# Patient Record
Sex: MALE | Race: BLACK OR AFRICAN AMERICAN
[De-identification: names, ages, dates, MRNs, and addresses within clinical notes are randomized per-mention and may not be internally consistent; named-entity substitution may affect disease eponyms.]

---

## 2017-03-03 ENCOUNTER — HOSPITAL ENCOUNTER (EMERGENCY)
Dept: HOSPITAL 72 - EMR | Age: 37
Discharge: HOME | End: 2017-03-03
Payer: MEDICARE

## 2017-03-03 VITALS — HEIGHT: 70 IN | BODY MASS INDEX: 35.07 KG/M2 | WEIGHT: 245 LBS

## 2017-03-03 VITALS — DIASTOLIC BLOOD PRESSURE: 81 MMHG | SYSTOLIC BLOOD PRESSURE: 137 MMHG

## 2017-03-03 VITALS — SYSTOLIC BLOOD PRESSURE: 141 MMHG | DIASTOLIC BLOOD PRESSURE: 93 MMHG

## 2017-03-03 DIAGNOSIS — J45.909: Primary | ICD-10-CM

## 2017-03-03 DIAGNOSIS — Z88.0: ICD-10-CM

## 2017-03-03 PROCEDURE — 94664 DEMO&/EVAL PT USE INHALER: CPT

## 2017-03-03 PROCEDURE — 94640 AIRWAY INHALATION TREATMENT: CPT

## 2017-03-03 PROCEDURE — 99283 EMERGENCY DEPT VISIT LOW MDM: CPT

## 2017-03-03 NOTE — EMERGENCY ROOM REPORT
History of Present Illness


General


Chief Complaint:  Asthma


Source:  Patient, Medical Record





Present Illness


HPI


The patient is a 36-year-old male with a history of asthma presenting with 

chest tightness which began this morning.  The patient states that he has run 

out of his albuterol which usually helps.  The patient denies any other 

symptoms including chest pain, shortness of breath, cough, nausea, vomiting, 

fever, chills, headache, dizziness


Allergies:  


Coded Allergies:  


     PENICILLINS (Unverified  Allergy, Unknown, 12/9/14)





Patient History


Past Medical History:  see triage record, asthma


Pertinent Family History:  none


Reviewed Nursing Documentation:  PMH: Agreed, PSxH: Agreed





Nursing Documentation-PMH


Hx Asthma:  Yes





Review of Systems


All Other Systems:  negative except mentioned in HPI





Physical Exam





Vital Signs








  Date Time  Temp Pulse Resp B/P Pulse Ox O2 Delivery O2 Flow Rate FiO2


 


3/3/17 15:23 99.0 114 16 141/93 99 Room Air  








Sp02 EP Interpretation:  reviewed, normal


General Appearance:  no apparent distress, alert, GCS 15, non-toxic


Head:  normocephalic, atraumatic


Eyes:  bilateral eye PERRL, bilateral eye normal inspection


ENT:  hearing grossly normal, normal pharynx, no angioedema, normal voice


Neck:  full range of motion, supple/symm/no masses


Respiratory:  chest non-tender, no respiratory distress, no retraction, no 

accessory muscle use, no wheezing, decreased breath sounds


Cardiovascular #1:  regular rate, rhythm, no edema


Musculoskeletal:  back normal, gait/station normal, normal range of motion, non-

tender


Neurologic:  alert, oriented x3, responsive, motor strength/tone normal, 

sensory intact, speech normal


Psychiatric:  judgement/insight normal, memory normal, mood/affect normal, no 

suicidal/homicidal ideation


Reflexes:  3+ bicep (R), 3+ bicep (L), 3+ tricep (R), 3+ tricep (L), 3+ knee (R)

, 3+ knee (L)


Skin:  normal color, no rash, warm/dry, well hydrated


Lymphatic:  no adenopathy





Medical Decision Making


PA Attestation


Dr. Ng is my supervising physician. Patient management was discussed with 

my supervising physician


Diagnostic Impression:  


 Primary Impression:  


 Asthma


ER Course


The patient is a 36-year-old male with a history of asthma presenting for 

asthma exacerbation





Differential diagnoses considered but not limited to: Asthma exacerbation, 

bronchitis, pneumonia, anxiety





Physical exam: Vitals within normal limits.  No apparent distress


HEENT exam is unremarkable


Lungs: Decreased breath sounds bilaterally.  Chest is nontender.  No 

respiratory distress.  No accessory muscle use.





The patient was given a breathing treatment and is feeling much better.


Lungs sounds have increased





Patient is discharged home with a prescription for albuterol and will followup 

with PMD.  ER precautions are given





Last Vital Signs








  Date Time  Temp Pulse Resp B/P Pulse Ox O2 Delivery O2 Flow Rate FiO2


 


3/3/17 16:44 99.0 100 18 137/81 100 Room Air  








Status:  improved


Disposition:  HOME, SELF-CARE


Condition:  Improved


Scripts


Albuterol Sulfate* (PROAIR HFA*) 8.5 Gm Hfa.aer.ad


2 PUFFS INH Q6H, #8.5 GM 0 Refills


   Prov: PRIYANKA MONTEMAYOR         3/3/17


Referrals:  


Zarina Villegas MD (PCP)


Patient Instructions:  Asthma, Adult





Additional Instructions:  


I discussed my findings with the patient. All questions and concerns have been 

answered. Treatment and medication compliance have been addressed. I advised 

the patient that they need to follow up with PMD in 3-5 days. Return to ED if 

symptoms worsen, new symptoms arise, or if needed for any reason. Patient 

verbalized understanding of discharge instructions.











PRIYANKA MONTEMAYOR Mar 3, 2017 17:59

## 2017-07-03 ENCOUNTER — HOSPITAL ENCOUNTER (INPATIENT)
Dept: HOSPITAL 72 - EMR | Age: 37
LOS: 2 days | Discharge: LEFT BEFORE DISCHARGE | DRG: 203 | End: 2017-07-05
Payer: MEDICARE

## 2017-07-03 VITALS — SYSTOLIC BLOOD PRESSURE: 125 MMHG | DIASTOLIC BLOOD PRESSURE: 80 MMHG

## 2017-07-03 VITALS — SYSTOLIC BLOOD PRESSURE: 134 MMHG | DIASTOLIC BLOOD PRESSURE: 67 MMHG

## 2017-07-03 VITALS — DIASTOLIC BLOOD PRESSURE: 73 MMHG | SYSTOLIC BLOOD PRESSURE: 116 MMHG

## 2017-07-03 VITALS — SYSTOLIC BLOOD PRESSURE: 104 MMHG | DIASTOLIC BLOOD PRESSURE: 43 MMHG

## 2017-07-03 VITALS — SYSTOLIC BLOOD PRESSURE: 148 MMHG | DIASTOLIC BLOOD PRESSURE: 100 MMHG

## 2017-07-03 VITALS — WEIGHT: 240 LBS | BODY MASS INDEX: 35.55 KG/M2 | HEIGHT: 69 IN

## 2017-07-03 VITALS — DIASTOLIC BLOOD PRESSURE: 88 MMHG | SYSTOLIC BLOOD PRESSURE: 149 MMHG

## 2017-07-03 VITALS — SYSTOLIC BLOOD PRESSURE: 135 MMHG | DIASTOLIC BLOOD PRESSURE: 72 MMHG

## 2017-07-03 VITALS — DIASTOLIC BLOOD PRESSURE: 75 MMHG | SYSTOLIC BLOOD PRESSURE: 128 MMHG

## 2017-07-03 DIAGNOSIS — M54.5: ICD-10-CM

## 2017-07-03 DIAGNOSIS — J45.901: Primary | ICD-10-CM

## 2017-07-03 DIAGNOSIS — F11.90: ICD-10-CM

## 2017-07-03 DIAGNOSIS — F12.90: ICD-10-CM

## 2017-07-03 DIAGNOSIS — E66.9: ICD-10-CM

## 2017-07-03 DIAGNOSIS — F15.90: ICD-10-CM

## 2017-07-03 DIAGNOSIS — D72.829: ICD-10-CM

## 2017-07-03 DIAGNOSIS — G47.30: ICD-10-CM

## 2017-07-03 LAB
ALBUMIN/GLOB SERPL: 1.3 {RATIO} (ref 1–2.7)
ALT SERPL-CCNC: 20 U/L (ref 3–41)
ANION GAP SERPL CALC-SCNC: 15 MMOL/L (ref 5–15)
APPEARANCE UR: CLEAR
APTT BLD: 29 SEC (ref 23–33)
ARTERIAL PATENCY WRIST A: POSITIVE
AST SERPL-CCNC: 21 U/L (ref 5–40)
BACTERIA #/AREA URNS HPF: (no result) /HPF
BASE EXCESS STD BLDA CALC-SCNC: -3.2 MMOL/L
BASOPHILS NFR BLD AUTO: 0.9 % (ref 0–2)
CALCIUM SERPL-MCNC: 9.1 MG/DL (ref 8.6–10.2)
CHLORIDE SERPL-SCNC: 100 MEQ/L (ref 98–107)
CK MB SERPL-MCNC: 5 NG/ML (ref ?–6.7)
CO2 SERPL-SCNC: 25 MEQ/L (ref 20–30)
CREAT SERPL-MCNC: 1.2 MG/DL (ref 0.7–1.2)
EOSINOPHIL NFR BLD AUTO: 4.2 % (ref 0–3)
ERYTHROCYTE [DISTWIDTH] IN BLOOD BY AUTOMATED COUNT: 13.2 % (ref 11.6–14.8)
GFR SERPLBLD BASED ON 1.73 SQ M-ARVRAT: > 60 ML/MIN (ref 60–?)
GLOBULIN SER-MCNC: 3.2 G/DL
HEMOLYSIS: 5
INR PPP: 1 (ref 0.9–1.1)
KETONES UR QL STRIP: NEGATIVE
LEUKOCYTE ESTERASE UR QL STRIP: (no result)
LYMPHOCYTES NFR BLD AUTO: 14.8 % (ref 20–45)
MCH RBC QN AUTO: 27.1 PG (ref 27–31)
MCHC RBC AUTO-ENTMCNC: 31.9 G/DL (ref 32–36)
MCV RBC AUTO: 85 FL (ref 80–99)
MONOCYTES NFR BLD AUTO: 6.6 % (ref 1–10)
MUCOUS THREADS #/AREA URNS LPF: (no result) /LPF
NEUTROPHILS NFR BLD AUTO: 73.5 % (ref 45–75)
NITRITE UR QL STRIP: NEGATIVE
PCO2 BLDA: 38.4 MMHG (ref 35–45)
PH UR STRIP: 5 [PH] (ref 4.5–8)
PLATELET # BLD: 240 K/UL (ref 150–450)
PMV BLD AUTO: 9.7 FL (ref 6.5–10.1)
POTASSIUM SERPL-SCNC: 3.8 MEQ/L (ref 3.4–4.9)
PROT SERPL-MCNC: 7.6 G/DL (ref 6.6–8.7)
PROT UR QL STRIP: (no result)
PROTHROMBIN TIME: 10.5 SEC (ref 9.3–11.5)
RBC # BLD AUTO: 5.48 M/UL (ref 4.7–6.1)
RBC #/AREA URNS HPF: (no result) /HPF (ref 0–0)
SODIUM SERPL-SCNC: 140 MEQ/L (ref 135–145)
SP GR UR STRIP: 1.02 (ref 1–1.03)
SQUAMOUS #/AREA URNS LPF: (no result) /LPF
TROPONIN I SERPL-MCNC: < 0.3 NG/ML (ref ?–0.3)
UROBILINOGEN UR-MCNC: NORMAL MG/DL (ref 0–1)
WBC # BLD AUTO: 13.4 K/UL (ref 4.8–10.8)
WBC #/AREA URNS HPF: (no result) /HPF (ref 0–0)

## 2017-07-03 PROCEDURE — 82553 CREATINE MB FRACTION: CPT

## 2017-07-03 PROCEDURE — 81003 URINALYSIS AUTO W/O SCOPE: CPT

## 2017-07-03 PROCEDURE — 36415 COLL VENOUS BLD VENIPUNCTURE: CPT

## 2017-07-03 PROCEDURE — 85025 COMPLETE CBC W/AUTO DIFF WBC: CPT

## 2017-07-03 PROCEDURE — 85379 FIBRIN DEGRADATION QUANT: CPT

## 2017-07-03 PROCEDURE — 94660 CPAP INITIATION&MGMT: CPT

## 2017-07-03 PROCEDURE — 80300: CPT

## 2017-07-03 PROCEDURE — 87040 BLOOD CULTURE FOR BACTERIA: CPT

## 2017-07-03 PROCEDURE — 85730 THROMBOPLASTIN TIME PARTIAL: CPT

## 2017-07-03 PROCEDURE — 84484 ASSAY OF TROPONIN QUANT: CPT

## 2017-07-03 PROCEDURE — 71010: CPT

## 2017-07-03 PROCEDURE — 83880 ASSAY OF NATRIURETIC PEPTIDE: CPT

## 2017-07-03 PROCEDURE — 93005 ELECTROCARDIOGRAM TRACING: CPT

## 2017-07-03 PROCEDURE — 82550 ASSAY OF CK (CPK): CPT

## 2017-07-03 PROCEDURE — 94640 AIRWAY INHALATION TREATMENT: CPT

## 2017-07-03 PROCEDURE — 80053 COMPREHEN METABOLIC PANEL: CPT

## 2017-07-03 PROCEDURE — 85610 PROTHROMBIN TIME: CPT

## 2017-07-03 PROCEDURE — 87081 CULTURE SCREEN ONLY: CPT

## 2017-07-03 PROCEDURE — 94664 DEMO&/EVAL PT USE INHALER: CPT

## 2017-07-03 PROCEDURE — 36600 WITHDRAWAL OF ARTERIAL BLOOD: CPT

## 2017-07-03 PROCEDURE — 82803 BLOOD GASES ANY COMBINATION: CPT

## 2017-07-03 RX ADMIN — IPRATROPIUM BROMIDE AND ALBUTEROL SULFATE SCH ML: .5; 3 SOLUTION RESPIRATORY (INHALATION) at 11:00

## 2017-07-03 RX ADMIN — IPRATROPIUM BROMIDE AND ALBUTEROL SULFATE SCH ML: .5; 3 SOLUTION RESPIRATORY (INHALATION) at 15:28

## 2017-07-03 RX ADMIN — IPRATROPIUM BROMIDE AND ALBUTEROL SULFATE SCH ML: .5; 3 SOLUTION RESPIRATORY (INHALATION) at 23:00

## 2017-07-03 RX ADMIN — ALBUTEROL SULFATE SCH MG: 2.5 SOLUTION RESPIRATORY (INHALATION) at 03:01

## 2017-07-03 RX ADMIN — ALBUTEROL SULFATE SCH MG: 2.5 SOLUTION RESPIRATORY (INHALATION) at 02:50

## 2017-07-03 RX ADMIN — IPRATROPIUM BROMIDE AND ALBUTEROL SULFATE SCH ML: .5; 3 SOLUTION RESPIRATORY (INHALATION) at 20:43

## 2017-07-03 RX ADMIN — OXCARBAZEPINE SCH MG: 150 TABLET, FILM COATED ORAL at 18:28

## 2017-07-03 RX ADMIN — ALBUTEROL SULFATE SCH MG: 2.5 SOLUTION RESPIRATORY (INHALATION) at 02:35

## 2017-07-03 RX ADMIN — SODIUM CHLORIDE SCH MLS/HR: 900 INJECTION, SOLUTION INTRAVENOUS at 12:06

## 2017-07-03 RX ADMIN — HEPARIN SODIUM SCH UNITS: 5000 INJECTION INTRAVENOUS; SUBCUTANEOUS at 22:10

## 2017-07-03 RX ADMIN — HEPARIN SODIUM SCH UNITS: 5000 INJECTION INTRAVENOUS; SUBCUTANEOUS at 14:45

## 2017-07-03 NOTE — HISTORY AND PHYSICAL REPORT
DATE OF ADMISSION:  07/03/2017



CHIEF COMPLAINT:  Shortness of breath.



HISTORY OF PRESENT ILLNESS:  This is a 37-year-old 

male with history of bronchial asthma.  The patient is admitted with acute

exacerbation of his asthma.



PAST MEDICAL HISTORY:

1. Bronchial asthma.

2. Chronic low back pain.

3. Obesity.

4. History of proteinuria.

5. History of methamphetamine use.

6. History of suicidal ideations in the past.



MEDICATIONS:  Home medications _____ including Norco, albuterol

inhalation, Zithromax, cephalexin, ciprofloxacin, clindamycin, Flexeril,

famotidine with codeine syrup, fluticasone propionate nasal spray,

gentamicin eye drops, ibuprofen, Latuda, pyridoxine, Trileptal,

risperidone, Zoloft, and tramadol.



ALLERGIES:  Penicillins.



SOCIAL HISTORY:  He lives at home.



HABITS:  Significant for methamphetamine use.  He denies cigarette

smoking.



FAMILY HISTORY:  Unremarkable.



REVIEW OF SYSTEMS:  HEENT:  Ears, Nose, and Throat, normal.

Endocrine:  No history of diabetes, thyroid, or adrenal problems.

Respiratory:  _____ history of present illness.  Cardiovascular:  Denies

chest pain or palpitations.  Gastrointestinal:  No hematochezia, melena,

hematemesis, diarrhea, or constipation.  Genitourinary:  He denies

dysuria, frequency, urgency, or hematuria.  Neurological:  No history of

stroke, syncope, or Parkinson disease.  Psychiatric:  Significant for

history of depression.



PHYSICAL EXAMINATION:

GENERAL:  This is a middle-aged  male, who is obese.

The patient is in moderate respiratory distress.

VITAL SIGNS:  Blood pressure 135/72, pulse 106 apical, respirations

20, temperature is 98.2 degrees, O2 saturation currently is 99% on 2

liters per minute nasal cannula.

HEENT:  The head is normocephalic and atraumatic.  Pupils are equal,

round, and reactive to light and accommodation consensually.

NECK:  Supple.  Trachea midline.  There was no lymphadenopathy or

thyromegaly.

LUNGS:  Bilateral wheezes and extreme with prolongation of the

expiratory phase.

HEART:  Regular rate and rhythm with tachycardia.

ABDOMEN:  Soft and nontender.  Bowel sounds were active.

EXTREMITIES:  No clubbing, cyanosis, or edema.

NEUROLOGICAL:  He is alert and oriented x4.  Cranial nerves II

through XII are intact.



LABORATORY AND ANCILLARY DATA:  CBC shows a white count 13,400.  The

rest is normal.  Serum chemistry, , otherwise within normal limits.

Urinalysis, 1+ leukocyte esterase, sediment is within normal limits, and

1+ protein.  Chest x-ray, no infiltrates.



ASSESSMENT:

1. Chronic obstructive pulmonary disease exacerbation.

2. _____ problems in past medical history.



PLAN:

1. Intensive respiratory treatments.

2. IV steroids.

3. IV antibiotics.

4. Hold narcotics.

5. Respiratory treatment.

6. Pulmonary consult.

7. ABG.









  ______________________________________________

  Ching Laureano M.D.





DR:  FLORES

D:  07/03/2017 08:46

T:  07/03/2017 10:02

JOB#:  [id number]

CC:

## 2017-07-03 NOTE — EMERGENCY ROOM REPORT
History of Present Illness


General


Chief Complaint:  Chest Pain


Source:  Patient





Present Illness


HPI


Patient presents with dyspnea.  This been on for several days.  He was given a 

breathing treatment several days ago emergency room but has not gotten better.  

Denies any fevers.  He is unable to produce any phlegm.  He has crackles and 

wheezes in his chest.  He denies any chest pain per se but has dyspnea on 

exertion.  Himself here.





In the past he's had  an admission for rule out which is negative.





Patient denies calf pain/swelling, hemoptysis.  Denies fever, chills, NVD, 

dysuria.





Denies smoking but admits to some drug use.  Denies SI or HI.





Never been this dyspneic.


Allergies:  


Coded Allergies:  


     PENICILLINS (Unverified  Allergy, Unknown, 12/9/14)





Patient History


Past Medical History:  see triage record


Social History:  Reports: drug use, Denies: smoking


Social History Narrative


drove himself here


Reviewed Nursing Documentation:  PMH: Agreed, PSxH: Agreed





Nursing Documentation-PMH


Past Medical History:  No History, Except For


Hx Asthma:  Yes





Review of Systems


All Other Systems:  negative except mentioned in HPI





Physical Exam





Vital Signs








  Date Time  Temp Pulse Resp B/P Pulse Ox O2 Delivery O2 Flow Rate FiO2


 


7/3/17 02:10 100.0 112 16 129/83 89 Room Air  








Sp02 EP Interpretation:  reviewed, abnormal - hypoxemic on room air


General Appearance:  well appearing, GCS 15, mild distress


Head:  normocephalic


Eyes:  bilateral eye PERRL, bilateral eye Scleral Injection


ENT:  moist mucus membranes


Neck:  supple


Respiratory:  crackles, rales, wheezing, expiration


Cardiovascular #1:  regular rate, rhythm, no edema, no JVD


Cardiovascular #2:  2+ radial (R)


Gastrointestinal:  normal inspection, normal bowel sounds, non tender, no mass, 

non-distended


Musculoskeletal:  back normal, gait/station normal, normal range of motion


Neurologic:  alert, oriented x3, grossly normal


Psychiatric:  mood/affect normal


Skin:  normal inspection, warm/dry





Medical Decision Making


Diagnostic Impression:  


 Primary Impression:  


 Hypoxia


 Additional Impressions:  


 Amphetamine abuse


 Leukocytosis


 Qualified Codes:  D72.829 - Elevated white blood cell count, unspecified


ER Course


Patient presents with dyspnea with rales.  Differential includes pneumonia, 

congestive heart failure, bronchospasm, PE amongst others.  Emergent evaluation 

with EKG, chest x-ray and labs undertaken.  The patient be treated with aspirin 

and nitroglycerin paste morphine and also breathing treatments.  Patient is 

quite hypoxemic on oxygen.





D dimer is negative.





BNP is low.





CXR not significant infiltrates, though with elevated WBC, need to cover for 

pneumonia.





+ tox screen.





Slight improvement, but still with hypoxia.





Discussed with Dr. Miller - admit telemetry.





Laboratory Tests








Test


  7/3/17


02:35 7/3/17


03:55


 


White Blood Count


  13.4 K/UL


(4.8-10.8)  H 


 


 


Red Blood Count


  5.48 M/UL


(4.70-6.10) 


 


 


Hemoglobin


  14.8 G/DL


(14.2-18.0) 


 


 


Hematocrit


  46.5 %


(42.0-52.0) 


 


 


Mean Corpuscular Volume 85 FL (80-99)   


 


Mean Corpuscular Hemoglobin


  27.1 PG


(27.0-31.0) 


 


 


Mean Corpuscular Hemoglobin


Concent 31.9 G/DL


(32.0-36.0)  L 


 


 


Red Cell Distribution Width


  13.2 %


(11.6-14.8) 


 


 


Platelet Count


  240 K/UL


(150-450) 


 


 


Mean Platelet Volume


  9.7 FL


(6.5-10.1) 


 


 


Neutrophils (%) (Auto)


  73.5 %


(45.0-75.0) 


 


 


Lymphocytes (%) (Auto)


  14.8 %


(20.0-45.0)  L 


 


 


Monocytes (%) (Auto)


  6.6 %


(1.0-10.0) 


 


 


Eosinophils (%) (Auto)


  4.2 %


(0.0-3.0)  H 


 


 


Basophils (%) (Auto)


  0.9 %


(0.0-2.0) 


 


 


Prothrombin Time


  10.5 SEC


(9.30-11.50) 


 


 


Prothrombin Time INR 1.0 (0.9-1.1)   


 


PTT


  29 SEC (23-33)


  


 


 


D-Dimer


  < 100 ng/mL


(<500) 


 


 


Sodium Level


  140 mEQ/L


(135-145) 


 


 


Potassium Level


  3.8 mEQ/L


(3.4-4.9) 


 


 


Chloride Level


  100 mEQ/L


() 


 


 


Carbon Dioxide Level


  25 mEQ/L


(20-30) 


 


 


Anion Gap 15 (5-15)   


 


Blood Urea Nitrogen


  13 mg/dL


(7-23) 


 


 


Creatinine


  1.2 mg/dL


(0.7-1.2) 


 


 


Estimate Glomerular


Filtration Rate > 60 mL/min


(>60) 


 


 


Glucose Level


  101 mg/dL


() 


 


 


Calcium Level


  9.1 mg/dL


(8.6-10.2) 


 


 


Total Bilirubin


  0.2 mg/dL


(0.0-1.2) 


 


 


Aspartate Amino Transferase


(AST) 21 U/L (5-40)  


  


 


 


Alanine Aminotransferase (ALT) 20 U/L (3-41)   


 


Alkaline Phosphatase


  73 U/L


() 


 


 


Total Creatine Kinase


  515 U/L


()  H 


 


 


Creatine Kinase MB


  5.0 ng/mL (<


6.7) 


 


 


Creatine Kinase MB Relative


Index 0.9  


  


 


 


Troponin I


  < 0.30 ng/mL


(<=0.30) 


 


 


Pro-B-Type Natriuretic Peptide


  9 pg/mL


(0-125) 


 


 


Total Protein


  7.6 g/dL


(6.6-8.7) 


 


 


Albumin


  4.4 g/dL


(3.5-5.2) 


 


 


Globulin 3.2 g/dL   


 


Albumin/Globulin Ratio 1.3 (1.0-2.7)   


 


Urine Color  Yellow  


 


Urine Appearance  Clear  


 


Urine pH  5 (4.5-8.0)  


 


Urine Specific Gravity


  


  1.025


(1.005-1.035)


 


Urine Protein


  


  1+ (NEGATIVE)


H


 


Urine Glucose (UA)


  


  Negative


(NEGATIVE)


 


Urine Ketones


  


  Negative


(NEGATIVE)


 


Urine Occult Blood


  


  Negative


(NEGATIVE)


 


Urine Nitrite


  


  Negative


(NEGATIVE)


 


Urine Bilirubin


  


  Negative


(NEGATIVE)


 


Urine Urobilinogen


  


  Normal MG/DL


(0.0-1.0)


 


Urine Leukocyte Esterase


  


  1+ (NEGATIVE)


H


 


Urine RBC


  


  0-2 /HPF (0 -


0)  H


 


Urine WBC


  


  2-4 /HPF (0 -


0)


 


Urine Squamous Epithelial


Cells 


  Occasional


/LPF


 


Urine Bacteria


  


  Few /HPF


(NONE)


 


Urine Mucus


  


  Moderate /LPF


(NONE/OCC)  H


 


Urine Opiates Screen


  


  Positive


(NEGATIVE)  H


 


Urine Barbiturates Screen


  


  Negative


(NEGATIVE)


 


Phencyclidine (PCP) Screen


  


  Negative


(NEGATIVE)


 


Urine Amphetamines Screen


  


  Positive


(NEGATIVE)  H


 


Urine Benzodiazepines Screen


  


  Negative


(NEGATIVE)


 


Urine Cocaine Screen


  


  Negative


(NEGATIVE)


 


Urine Marijuana (THC) Screen


  


  Positive


(NEGATIVE)  H








EKG Diagnostic Results


Rate:  tachycardiac


Rhythm:  NSR


ST Segments:  no acute changes





Rhythm Strip Diag. Results


EP Interpretation:  yes


Rhythm:  no PVC's, no ectopy, other - ST





Chest X-Ray Diagnostic Results


Chest X-Ray Diagnostic Results :  


   Chest X-Ray Ordered:  Yes


   # of Views/Limited/Complete:  1 View


   EP Interpretation:  Yes


   Interpretation:  no consolidation, no effusion, no pneumothorax, no acute 

cardiopulmonary disease


   Indication:  Shortness of Breath


   Impression:  Other


   Interpreting ER Provider:  Electronically signed by Man Ng MD





Last Vital Signs








  Date Time  Temp Pulse Resp B/P Pulse Ox O2 Delivery O2 Flow Rate FiO2


 


7/3/17 05:00  97 16 116/73 96 Nasal Cannula 2.0 


 


7/3/17 04:52 99.0       








Status:  improved


Disposition:  ADMITTED AS INPATIENT


Condition:  Serious











Man Ng M.D. Jul 3, 2017 02:25

## 2017-07-03 NOTE — CONSULTATION
Consult Note


Consult Note





HISTORY OF PRESENT ILLNESS:  37-year-old 


male with history of bronchial asthma who presents with exacerbation and 

shortness of breath.  The patient is admitted with acute


exacerbation of his asthma and started on therapy.  I was asked to see and 

evaluate the patient's respiratory symptoms and assess for further improvement.

  Patient findings discussed. ER notes reviewed. HP reviewed.  no fevers or 

chills. chest tightness noted





PAST MEDICAL HISTORY:


1. Bronchial asthma.


2. Chronic low back pain.


3. Obesity.


4. History of proteinuria.


5. History of methamphetamine use.


6. History of suicidal ideations in the past.





MEDICATIONS:  reviewed and reconciled





ALLERGIES:  Penicillins.





SOCIAL HISTORY:  Significant for methamphetamine use.  He denies cigarette


smoking. currently unemployed





FAMILY HISTORY:  Unremarkable.





REVIEW OF SYSTEMS:  all 10 point reviewed





PHYSICAL EXAMINATION:


GENERAL:  This is a middle-aged  male, with mild dyspnea


VITAL SIGNS:  Blood pressure 134/67, pulse 97 apical, respirations


20, temperature is 98.2 degrees, O2 saturation currently is 98% on 2


liters per minute nasal cannula.


HEENT:  The head is normocephalic and atraumatic.  Pupils are equal,


round, and reactive to light and accommodation consensually.


NECK:  Supple.  carotids 2+


LUNGS:  Bilateral wheezes and reduced air entry


HEART:  Regular rate and rhythm with MRG


ABDOMEN:  Soft and nontender.  Bowel sounds were active. no HSM


EXTREMITIES:  No clubbing, cyanosis, or edema.


NEUROLOGICAL:  He is alert and oriented x4.  Cranial nerves II


through XII are intact.





LABORATORY AND ANCILLARY DATA:  





Labs








Test


  7/3/17


02:35 7/3/17


03:55 7/3/17


08:41


 


White Blood Count


  13.4 K/UL


(4.8-10.8) 


  


 


 


Red Blood Count


  5.48 M/UL


(4.70-6.10) 


  


 


 


Hemoglobin


  14.8 G/DL


(14.2-18.0) 


  


 


 


Hematocrit


  46.5 %


(42.0-52.0) 


  


 


 


Mean Corpuscular Volume 85 FL (80-99)   


 


Mean Corpuscular Hemoglobin


  27.1 PG


(27.0-31.0) 


  


 


 


Mean Corpuscular Hemoglobin


Concent 31.9 G/DL


(32.0-36.0) 


  


 


 


Red Cell Distribution Width


  13.2 %


(11.6-14.8) 


  


 


 


Platelet Count


  240 K/UL


(150-450) 


  


 


 


Mean Platelet Volume


  9.7 FL


(6.5-10.1) 


  


 


 


Neutrophils (%) (Auto)


  73.5 %


(45.0-75.0) 


  


 


 


Lymphocytes (%) (Auto)


  14.8 %


(20.0-45.0) 


  


 


 


Monocytes (%) (Auto)


  6.6 %


(1.0-10.0) 


  


 


 


Eosinophils (%) (Auto)


  4.2 %


(0.0-3.0) 


  


 


 


Basophils (%) (Auto)


  0.9 %


(0.0-2.0) 


  


 


 


Prothrombin Time


  10.5 SEC


(9.30-11.50) 


  


 


 


Prothromb Time International


Ratio 1.0 (0.9-1.1) 


  


  


 


 


Activated Partial


Thromboplast Time 29 SEC (23-33) 


  


  


 


 


D-Dimer


  < 100 ng/mL


(<500) 


  


 


 


Sodium Level


  140 mEQ/L


(135-145) 


  


 


 


Potassium Level


  3.8 mEQ/L


(3.4-4.9) 


  


 


 


Chloride Level


  100 mEQ/L


() 


  


 


 


Carbon Dioxide Level


  25 mEQ/L


(20-30) 


  


 


 


Anion Gap 15 (5-15)   


 


Blood Urea Nitrogen


  13 mg/dL


(7-23) 


  


 


 


Creatinine


  1.2 mg/dL


(0.7-1.2) 


  


 


 


Estimat Glomerular Filtration


Rate > 60 mL/min


(>60) 


  


 


 


Glucose Level


  101 mg/dL


() 


  


 


 


Calcium Level


  9.1 mg/dL


(8.6-10.2) 


  


 


 


Total Bilirubin


  0.2 mg/dL


(0.0-1.2) 


  


 


 


Aspartate Amino Transf


(AST/SGOT) 21 U/L (5-40) 


  


  


 


 


Alanine Aminotransferase


(ALT/SGPT) 20 U/L (3-41) 


  


  


 


 


Alkaline Phosphatase


  73 U/L


() 


  


 


 


Total Creatine Kinase


  515 U/L


() 


  


 


 


Creatine Kinase MB


  5.0 ng/mL (<


6.7) 


  


 


 


Creatine Kinase MB Relative


Index 0.9 


  


  


 


 


Troponin I


  < 0.30 ng/mL


(<=0.30) 


  


 


 


Pro-B-Type Natriuretic Peptide


  9 pg/mL


(0-125) 


  


 


 


Total Protein


  7.6 g/dL


(6.6-8.7) 


  


 


 


Albumin


  4.4 g/dL


(3.5-5.2) 


  


 


 


Globulin 3.2 g/dL   


 


Albumin/Globulin Ratio 1.3 (1.0-2.7)   


 


Urine Color  Yellow  


 


Urine Appearance  Clear  


 


Urine pH  5 (4.5-8.0)  


 


Urine Specific Gravity


  


  1.025


(1.005-1.035) 


 


 


Urine Protein  1+ (NEGATIVE)  


 


Urine Glucose (UA)


  


  Negative


(NEGATIVE) 


 


 


Urine Ketones


  


  Negative


(NEGATIVE) 


 


 


Urine Occult Blood


  


  Negative


(NEGATIVE) 


 


 


Urine Nitrite


  


  Negative


(NEGATIVE) 


 


 


Urine Bilirubin


  


  Negative


(NEGATIVE) 


 


 


Urine Urobilinogen


  


  Normal MG/DL


(0.0-1.0) 


 


 


Urine Leukocyte Esterase  1+ (NEGATIVE)  


 


Urine RBC


  


  0-2 /HPF (0 -


0) 


 


 


Urine WBC


  


  2-4 /HPF (0 -


0) 


 


 


Urine Squamous Epithelial


Cells 


  Occasional


/LPF 


 


 


Urine Bacteria


  


  Few /HPF


(NONE) 


 


 


Urine Mucus


  


  Moderate /LPF


(NONE/OCC) 


 


 


Urine Opiates Screen


  


  Positive


(NEGATIVE) 


 


 


Urine Barbiturates Screen


  


  Negative


(NEGATIVE) 


 


 


Phencyclidine (PCP) Screen


  


  Negative


(NEGATIVE) 


 


 


Urine Amphetamines Screen


  


  Positive


(NEGATIVE) 


 


 


Urine Benzodiazepines Screen


  


  Negative


(NEGATIVE) 


 


 


Urine Cocaine Screen


  


  Negative


(NEGATIVE) 


 


 


Urine Marijuana (THC) Screen


  


  Positive


(NEGATIVE) 


 


 


Arterial Blood pH


  


  


  7.369


(7.350-7.450)


 


Arterial Blood Partial


Pressure CO2 


  


  38.4 mmHg


(35.0-45.0)


 


Arterial Blood Partial


Pressure O2 


  


  95.3 mmHg


(75.0-100.0)


 


Arterial Blood HCO3


  


  


  21.7 mmol/L


(22.0-26.0)


 


Arterial Blood Oxygen


Saturation 


  


  96.8 %


(92.0-98.0)


 


Arterial Blood Base Excess   -3.2 


 


Damien Test   Positive 











ASSESSMENT:


1. Asthma exacerbation.


2. Leukocytosis


3. Respiratory insufficiency


4. shortness of breath





PLAN:


1. Oxygen therapy


2. IV steroids and slow taper


3. IV antibiotics and monitor


4. follow up PFT as outpatient


5. Respiratory treatment.


6. monitor for improvement











MIGUEL FIGUEROA Jul 3, 2017 13:15

## 2017-07-04 VITALS — DIASTOLIC BLOOD PRESSURE: 77 MMHG | SYSTOLIC BLOOD PRESSURE: 129 MMHG

## 2017-07-04 VITALS — SYSTOLIC BLOOD PRESSURE: 134 MMHG | DIASTOLIC BLOOD PRESSURE: 72 MMHG

## 2017-07-04 VITALS — SYSTOLIC BLOOD PRESSURE: 133 MMHG | DIASTOLIC BLOOD PRESSURE: 6 MMHG

## 2017-07-04 VITALS — SYSTOLIC BLOOD PRESSURE: 124 MMHG | DIASTOLIC BLOOD PRESSURE: 76 MMHG

## 2017-07-04 VITALS — SYSTOLIC BLOOD PRESSURE: 123 MMHG | DIASTOLIC BLOOD PRESSURE: 73 MMHG

## 2017-07-04 VITALS — DIASTOLIC BLOOD PRESSURE: 75 MMHG | SYSTOLIC BLOOD PRESSURE: 140 MMHG

## 2017-07-04 RX ADMIN — OXCARBAZEPINE SCH MG: 150 TABLET, FILM COATED ORAL at 08:52

## 2017-07-04 RX ADMIN — IPRATROPIUM BROMIDE AND ALBUTEROL SULFATE SCH ML: .5; 3 SOLUTION RESPIRATORY (INHALATION) at 23:00

## 2017-07-04 RX ADMIN — SODIUM CHLORIDE SCH MLS/HR: 900 INJECTION, SOLUTION INTRAVENOUS at 04:00

## 2017-07-04 RX ADMIN — SERTRALINE HYDROCHLORIDE SCH MG: 100 TABLET, FILM COATED ORAL at 08:56

## 2017-07-04 RX ADMIN — SERTRALINE HYDROCHLORIDE SCH MG: 100 TABLET, FILM COATED ORAL at 08:52

## 2017-07-04 RX ADMIN — IPRATROPIUM BROMIDE AND ALBUTEROL SULFATE SCH ML: .5; 3 SOLUTION RESPIRATORY (INHALATION) at 11:00

## 2017-07-04 RX ADMIN — METHYLPREDNISOLONE SODIUM SUCCINATE SCH MG: 125 INJECTION, POWDER, FOR SOLUTION INTRAMUSCULAR; INTRAVENOUS at 08:53

## 2017-07-04 RX ADMIN — HEPARIN SODIUM SCH UNITS: 5000 INJECTION INTRAVENOUS; SUBCUTANEOUS at 13:45

## 2017-07-04 RX ADMIN — IPRATROPIUM BROMIDE AND ALBUTEROL SULFATE SCH ML: .5; 3 SOLUTION RESPIRATORY (INHALATION) at 07:06

## 2017-07-04 RX ADMIN — IPRATROPIUM BROMIDE AND ALBUTEROL SULFATE SCH ML: .5; 3 SOLUTION RESPIRATORY (INHALATION) at 18:46

## 2017-07-04 RX ADMIN — IPRATROPIUM BROMIDE AND ALBUTEROL SULFATE SCH ML: .5; 3 SOLUTION RESPIRATORY (INHALATION) at 10:53

## 2017-07-04 RX ADMIN — SODIUM CHLORIDE SCH MLS/HR: 900 INJECTION, SOLUTION INTRAVENOUS at 18:00

## 2017-07-04 RX ADMIN — IPRATROPIUM BROMIDE AND ALBUTEROL SULFATE SCH ML: .5; 3 SOLUTION RESPIRATORY (INHALATION) at 03:00

## 2017-07-04 RX ADMIN — IPRATROPIUM BROMIDE AND ALBUTEROL SULFATE SCH ML: .5; 3 SOLUTION RESPIRATORY (INHALATION) at 15:25

## 2017-07-04 RX ADMIN — HEPARIN SODIUM SCH UNITS: 5000 INJECTION INTRAVENOUS; SUBCUTANEOUS at 21:17

## 2017-07-04 RX ADMIN — HEPARIN SODIUM SCH UNITS: 5000 INJECTION INTRAVENOUS; SUBCUTANEOUS at 05:50

## 2017-07-04 RX ADMIN — OXCARBAZEPINE SCH MG: 150 TABLET, FILM COATED ORAL at 08:56

## 2017-07-04 NOTE — GENERAL PROGRESS NOTE
Assessment/Plan


Assessment/Plan


Asthma Exacerbation - BIPAP, IV Abx, steroids, bronchodilators.


slowly improving.





Subjective


Allergies:  


Coded Allergies:  


     PENICILLINS (Unverified  Allergy, Unknown, 12/9/14)


Subjective


Sleeping.





Objective





Last 24 Hour Vital Signs








  Date Time  Temp Pulse Resp B/P Pulse Ox O2 Delivery O2 Flow Rate FiO2


 


7/4/17 09:14  85 16  98 Facial  30


 


7/4/17 08:00 96.9 88 18 133/6 98 Room Air  


 


7/4/17 08:00  95      


 


7/4/17 07:16  86 20  99 Nasal Cannula 2.0 28 7/4/17 07:06  85 20   Nasal Cannula 2.0 28 7/4/17 07:06      Nasal Cannula 2.0 28 7/4/17 07:06  85 20  98 Nasal Cannula 2.0 28 7/4/17 04:34  92      


 


7/4/17 04:21        30


 


7/4/17 04:13 98.4 80 21 128/84 96 Bi-pap  


 


7/4/17 04:08  94 16  98 Facial  30


 


7/4/17 03:30      Bi-pap  


 


7/4/17 03:30      Bi-pap  


 


7/4/17 00:43  92 24  98 Facial  30


 


7/4/17 00:20  91      


 


7/4/17 00:05 97.9 88 20 123/73 98 Nasal Cannula 2.0 


 


7/4/17 00:02        30


 


7/3/17 23:30  94 27  98 Facial  30


 


7/3/17 23:06      Nasal Cannula 2.0 28


 


7/3/17 23:05      Nasal Cannula 2.0 28


 


7/3/17 20:34 96.3 99 20 148/100 95 Room Air  


 


7/3/17 20:00  102      


 


7/3/17 19:45  94 20  99 Nasal Cannula 2.0 28


 


7/3/17 19:30  92 20   Nasal Cannula 2.0 28


 


7/3/17 19:30  92 20  98 Nasal Cannula 2.0 28


 


7/3/17 19:30      Nasal Cannula 2.0 28


 


7/3/17 16:00  99      


 


7/3/17 15:34  82 16  98 Room Air  


 


7/3/17 15:29 97.7 111 20 149/88 97 Room Air  


 


7/3/17 15:28  79 16  100 Room Air  21


 


7/3/17 12:00  102      


 


7/3/17 11:22 97.7 102 20 134/67 100 Simple Mask  

















Intake and Output  


 


 7/3/17 7/4/17





 19:00 07:00


 


Intake Total 1078 ml 715 ml


 


Balance 1078 ml 715 ml


 


  


 


Intake Oral 630 ml 


 


IV Total 448 ml 715 ml


 


# Voids 2 1


 


# Bowel Movements 1 








Laboratory Tests


7/3/17 17:40: 


Urine Opiates Screen Negative, Urine Barbiturates Screen Negative, 

Phencyclidine (PCP) Screen Negative, Urine Amphetamines Screen PositiveH, Urine 

Benzodiazepines Screen Negative, Urine Cocaine Screen Negative, Urine Marijuana 

(THC) Screen PositiveH


Height (Feet):  5


Height (Inches):  9.00


Weight (Pounds):  240


Objective


On BIPAP.


Cv RR


Lungs B wheezes.


Abd SNT. BS +


E No CCE











JODY NICK Jul 4, 2017 10:24

## 2017-07-04 NOTE — PULMONOLOGY PROGRESS NOTE
Assessment/Plan


Assessment/Plan





ASSESSMENT:


1. Asthma exacerbation.


2. Leukocytosis


3. Respiratory insufficiency


4. shortness of breath


5. sleep apnea


6. obesity





PLAN:


1. Oxygen therapy to off


2. IV steroids- may change to medrol daniel


3. IV antibiotics -may change to Zpak


4. follow up PFT and sleep study as outpatient


5. Respiratory treatment.


6. monitor for improvement and may proceed with dc planning with close 

outpatient follow up





Subjective


Allergies:  


Coded Allergies:  


     PENICILLINS (Unverified  Allergy, Unknown, 12/9/14)


Subjective


needed BIPAP for sleep apnea


doing better


more alert





Objective





Last 24 Hour Vital Signs








  Date Time  Temp Pulse Resp B/P Pulse Ox O2 Delivery O2 Flow Rate FiO2


 


7/4/17 09:14  85 16  98 Facial  30


 


7/4/17 08:00 96.9 88 18 133/6 98 Room Air  


 


7/4/17 08:00  95      


 


7/4/17 07:16  86 20  99 Nasal Cannula 2.0 28 7/4/17 07:06  85 20   Nasal Cannula 2.0 28 7/4/17 07:06      Nasal Cannula 2.0 28 7/4/17 07:06  85 20  98 Nasal Cannula 2.0 28 7/4/17 04:34  92      


 


7/4/17 04:21        30


 


7/4/17 04:13 98.4 80 21 128/84 96 Bi-pap  


 


7/4/17 04:08  94 16  98 Facial  30


 


7/4/17 03:30      Bi-pap  


 


7/4/17 03:30      Bi-pap  


 


7/4/17 00:43  92 24  98 Facial  30


 


7/4/17 00:20  91      


 


7/4/17 00:05 97.9 88 20 123/73 98 Nasal Cannula 2.0 


 


7/4/17 00:02        30


 


7/3/17 23:30  94 27  98 Facial  30


 


7/3/17 23:06      Nasal Cannula 2.0 28


 


7/3/17 23:05      Nasal Cannula 2.0 28


 


7/3/17 20:34 96.3 99 20 148/100 95 Room Air  


 


7/3/17 20:00  102      


 


7/3/17 19:45  94 20  99 Nasal Cannula 2.0 28


 


7/3/17 19:30  92 20   Nasal Cannula 2.0 28


 


7/3/17 19:30  92 20  98 Nasal Cannula 2.0 28


 


7/3/17 19:30      Nasal Cannula 2.0 28


 


7/3/17 16:00  99      


 


7/3/17 15:34  82 16  98 Room Air  


 


7/3/17 15:29 97.7 111 20 149/88 97 Room Air  


 


7/3/17 15:28  79 16  100 Room Air  21


 


7/3/17 12:00  102      


 


7/3/17 11:22 97.7 102 20 134/67 100 Simple Mask  

















Intake and Output  


 


 7/3/17 7/4/17





 19:00 07:00


 


Intake Total 1078 ml 715 ml


 


Balance 1078 ml 715 ml


 


  


 


Intake Oral 630 ml 


 


IV Total 448 ml 715 ml


 


# Voids 2 1


 


# Bowel Movements 1 








Objective


WDWN


NAD


clearer breath sounds bilaterally with minimal rhonchi and wheeze


[id number] without MRG


NABS nontender no HSM


no CC; mild edema


nonfocal


EOMI


oropharynx clear


Laboratory Tests


7/3/17 17:40: 


Urine Opiates Screen Negative, Urine Barbiturates Screen Negative, 

Phencyclidine (PCP) Screen Negative, Urine Amphetamines Screen PositiveH, Urine 

Benzodiazepines Screen Negative, Urine Cocaine Screen Negative, Urine Marijuana 

(THC) Screen PositiveH





Current Medications








 Medications


  (Trade)  Dose


 Ordered  Sig/Basilio


 Route


 PRN Reason  Start Time


 Stop Time Status Last Admin


Dose Admin


 


 Al Hydroxide/Mg


 Hydroxide


  (Mylanta)  30 ml  Q6H  PRN


 ORAL


 Dyspepsia  7/4/17 05:00


 8/3/17 04:44   


 


 


 Albuterol/


 Ipratropium


  (DuoNeb


 0.5-3(2.5)mg/3ml)  3 ml  Q4HRT


 HHN


   7/3/17 11:00


 7/8/17 10:59  7/4/17 07:06


 


 


 Albuterol/


 Ipratropium 3 ml  3 ml  Q4H  PRN


 HHN


 Shortness of Breath  7/3/17 09:00


 7/8/17 08:59   


 


 


 Heparin Sodium


  (Porcine)


  (Heparin 5000


 units/ml)  5,000 units  EVERY 8  HOURS


 SUBQ


   7/3/17 14:00


 8/2/17 13:59  7/4/17 05:50


 


 


 Levofloxacin  100 ml @ 


 100 mls/hr  Q24H


 IVPB


   7/4/17 09:00


 7/11/17 08:59  7/4/17 08:53


 


 


 Methylprednisolone


 Sodium Succinate


  (Solu-MEDROL)  100 mg  DAILY


 IVP


   7/4/17 09:00


 8/3/17 08:59  7/4/17 08:53


 


 


 Non-Formulary


 Medication


  (Non-Formulary


 Med)  1 ea  DAILY


 ORAL


   7/4/17 09:00


 8/3/17 08:59 UNV  


 


 


 Oxcarbazepine


  (Trileptal)  300 mg  BID


 ORAL


   7/3/17 18:00


 8/2/17 17:59  7/4/17 08:52


 


 


 Potassium


 Chloride/Sodium


 Chloride


  (KCl/0.45% NS


 1000ml)  1,015 ml @ 


 65 mls/hr  [id number]


 IV


   7/3/17 11:00


 8/2/17 10:59  7/4/17 04:00


 


 


 Sertraline HCl


  (Zoloft)  100 mg  DAILY


 ORAL


   7/4/17 09:00


 8/3/17 08:59  7/4/17 08:52


 

















MIGUEL FIGUEROA Jul 4, 2017 10:46

## 2017-07-05 VITALS — DIASTOLIC BLOOD PRESSURE: 86 MMHG | SYSTOLIC BLOOD PRESSURE: 138 MMHG

## 2017-07-05 VITALS — SYSTOLIC BLOOD PRESSURE: 126 MMHG | DIASTOLIC BLOOD PRESSURE: 70 MMHG

## 2017-07-05 VITALS — DIASTOLIC BLOOD PRESSURE: 83 MMHG | SYSTOLIC BLOOD PRESSURE: 147 MMHG

## 2017-07-05 RX ADMIN — IPRATROPIUM BROMIDE AND ALBUTEROL SULFATE SCH ML: .5; 3 SOLUTION RESPIRATORY (INHALATION) at 03:00

## 2017-07-05 RX ADMIN — SERTRALINE HYDROCHLORIDE SCH MG: 100 TABLET, FILM COATED ORAL at 08:30

## 2017-07-05 RX ADMIN — IPRATROPIUM BROMIDE AND ALBUTEROL SULFATE SCH ML: .5; 3 SOLUTION RESPIRATORY (INHALATION) at 10:30

## 2017-07-05 RX ADMIN — METHYLPREDNISOLONE SODIUM SUCCINATE SCH MG: 125 INJECTION, POWDER, FOR SOLUTION INTRAMUSCULAR; INTRAVENOUS at 08:22

## 2017-07-05 RX ADMIN — SODIUM CHLORIDE SCH MLS/HR: 900 INJECTION, SOLUTION INTRAVENOUS at 09:20

## 2017-07-05 RX ADMIN — IPRATROPIUM BROMIDE AND ALBUTEROL SULFATE SCH ML: .5; 3 SOLUTION RESPIRATORY (INHALATION) at 07:07

## 2017-07-05 RX ADMIN — OXCARBAZEPINE SCH MG: 150 TABLET, FILM COATED ORAL at 08:30

## 2017-07-05 RX ADMIN — HEPARIN SODIUM SCH UNITS: 5000 INJECTION INTRAVENOUS; SUBCUTANEOUS at 06:16

## 2017-07-05 NOTE — PULMONOLOGY PROGRESS NOTE
Assessment/Plan


Assessment/Plan





ASSESSMENT:


1. Asthma exacerbation.


2. Leukocytosis


3. Respiratory insufficiency


4. shortness of breath


5. sleep apnea


6. obesity





PLAN:


1. Oxygen therapy to off


2. IV steroids- dc and start prednisone


3. IV antibiotics -change to Zpak


4. follow up PFT and sleep study as outpatient


5. Respiratory treatment.


6. dc planning with close outpatient follow up





impression, plan, and exam edited and reviewed in detail


care discussed with RN





Subjective


Allergies:  


Coded Allergies:  


     PENICILLINS (Unverified  Allergy, Unknown, 12/9/14)


Subjective


BIPAP for sleep apnea


seems improved


more alert





Objective





Last 24 Hour Vital Signs








  Date Time  Temp Pulse Resp B/P Pulse Ox O2 Delivery O2 Flow Rate FiO2


 


7/5/17 08:29 97.5 96 20 138/86 100 Nasal Cannula 2.0 


 


7/5/17 08:00  106      


 


7/5/17 07:13  97 18  99 Nasal Cannula 2.0 28


 


7/5/17 07:10  97 20  96 Nasal Cannula 2.0 


 


7/5/17 07:08  96 20   Nasal Cannula 2.0 28


 


7/5/17 07:08      Nasal Cannula 2.0 28


 


7/5/17 04:00 97.0 78 18 147/83 100 Nasal Cannula 2.0 


 


7/5/17 03:37  83      


 


7/5/17 03:26      Nasal Cannula 2.0 


 


7/5/17 03:25      Nasal Cannula 2.0 


 


7/5/17 00:00 97.7 81 19 126/70 98 Nasal Cannula 2.0 


 


7/5/17 00:00        30


 


7/4/17 23:45  89      


 


7/4/17 23:15      Bi-pap  


 


7/4/17 23:14      Bi-pap  


 


7/4/17 22:37  84 16  100 Facial  30


 


7/4/17 20:23  86      


 


7/4/17 20:00 97.9 85 19 140/75 98 Nasal Cannula 2.0 





  93      


 


7/4/17 18:50  101 18  100 Nasal Cannula 2.0 28 7/4/17 18:49  95 18  98 Nasal Cannula 2.0 


 


7/4/17 18:48  95 20   Nasal Cannula 2.0 28


 


7/4/17 18:48      Nasal Cannula 2.0 28


 


7/4/17 16:00 97.5 85 19 124/76 98   


 


7/4/17 16:00  97      


 


7/4/17 15:25  85 20  98 Bi-pap  


 


7/4/17 15:25  86 20  99 Nasal Cannula 2.0 28


 


7/4/17 12:00 97.5 91 18 129/77 100 Simple Mask  30


 


7/4/17 12:00  83      


 


7/4/17 10:53  86 20  99 Bi-pap  


 


7/4/17 10:53  85 20  98 Bi-pap  


 


7/4/17 10:53  86 17  98 Facial  30

















Intake and Output  


 


 7/4/17 7/5/17





 19:00 07:00


 


Intake Total 1640 ml 780 ml


 


Output Total 980 ml 300 ml


 


Balance 660 ml 480 ml


 


  


 


Intake Oral 760 ml 


 


IV Total 880 ml 780 ml


 


Output Urine Total 980 ml 300 ml


 


# Voids 4 








Objective


WDWN


NAD


clearer breath sounds bilaterally with minimal wheeze


[id number] without MRG


NABS nontender no HSM


no CC; mild edema


nonfocal


EOMI


oropharynx clear


obese





Current Medications








 Medications


  (Trade)  Dose


 Ordered  Sig/Basilio


 Route


 PRN Reason  Start Time


 Stop Time Status Last Admin


Dose Admin


 


 Al Hydroxide/Mg


 Hydroxide


  (Mylanta)  30 ml  Q6H  PRN


 ORAL


 Dyspepsia  7/4/17 05:00


 8/3/17 04:44  7/5/17 03:15


 


 


 Albuterol/


 Ipratropium


  (DuoNeb


 0.5-3(2.5)mg/3ml)  3 ml  Q4HRT


 HHN


   7/3/17 11:00


 7/8/17 10:59  7/5/17 07:07


 


 


 Albuterol/


 Ipratropium 3 ml  3 ml  Q4H  PRN


 HHN


 Shortness of Breath  7/3/17 09:00


 7/8/17 08:59   


 


 


 Heparin Sodium


  (Porcine)


  (Heparin 5000


 units/ml)  5,000 units  EVERY 8  HOURS


 SUBQ


   7/3/17 14:00


 8/2/17 13:59  7/5/17 06:16


 


 


 Levofloxacin  100 ml @ 


 100 mls/hr  Q24H


 IVPB


   7/4/17 09:00


 7/11/17 08:59  7/5/17 08:22


 


 


 Methylprednisolone


 Sodium Succinate


  (Solu-MEDROL)  100 mg  DAILY


 IVP


   7/4/17 09:00


 8/3/17 08:59  7/5/17 08:22


 


 


 Non-Formulary


 Medication


  (Non-Formulary


 Med)  1 ea  DAILY


 ORAL


   7/4/17 09:00


 8/3/17 08:59 UNV  


 


 


 Oxcarbazepine


  (Trileptal)  300 mg  BID


 ORAL


   7/3/17 18:00


 8/2/17 17:59  7/4/17 08:52


 


 


 Potassium


 Chloride/Sodium


 Chloride


  (KCl/0.45% NS


 1000ml)  1,015 ml @ 


 65 mls/hr  [id number]


 IV


   7/3/17 11:00


 8/2/17 10:59  7/5/17 09:20


 


 


 Sertraline HCl


  (Zoloft)  100 mg  DAILY


 ORAL


   7/4/17 09:00


 8/3/17 08:59  7/4/17 08:52


 

















MIGUEL FIGUEROA Jul 5, 2017 10:29

## 2017-07-06 NOTE — DISCHARGE SUMMARY 2 SIG
DATE OF ADMISSION:  07/03/2017



DATE OF DISCHARGE:  07/05/2017



CONSULTANTS:  Giancarlo Zhong M.D.



BRIEF HOSPITAL COURSE:  The patient is a 37-year-old male with

history of bronchial asthma, presented to ED complaining of dyspnea

that has been ongoing for several days. He was recently at another emergency 
room,

where he was given breathing treatment and sent home.  However, symptoms did 
not get

better.  On presentation to ED, he was dyspneic with rales. He was given

aspirin and nitroglycerin paste and morphine as well as breathing

treatments.  D-dimer was negative.  BNP was 9.  Urine toxicology was

positive for opiates, amphetamines, and  marijuana.  Chest x-ray done

showed no consolidation, no effusion, and no pneumothorax.  WBC showed

leukocytosis.  The patient was admitted to telemetry for chronic

obstructive pulmonary disease exacerbation and was given respiratory

treatments and was started on IV steroids.  He was followed by

pulmonologist, Dr. Zhong and was given intravenous levofloxacin.  He was

tapered off BiPAP and was placed on nasal cannula.  Full treatment was not

carried out as the patient left AMA.



FINAL DIAGNOSES:

1. Acute asthma exacerbation.

2. Leukocytosis.

3. Respiratory insufficiency.

4. Sleep apnea.

5. Obesity.







  ______________________________________________

  Ching Laureano M.D.



I have been assigned to dictate discharge summary on this account and I

was not involved in the patient's management.



  ______________________________________________

  Veronica Cheung N.P.





DR:  CURT

D:  07/06/2017 15:31

T:  07/06/2017 17:02

JOB#:  [id number]

CC:



SAMAN

## 2017-07-06 NOTE — DISCHARGE SUMMARY
Discharge Summary


Hospital Course


Date of Admission


Jul 3, 2017 at 04:44


Date of Discharge


Jul 5, 2017 at 11:35


Admitting Diagnosis


hypoxia


JOYCE Yanes is a 37 year old male who was admitted on Jul 3, 2017 at 04:44 

for Hypoxia


Hospital Course


[id number]





Discharge


Discharge Disposition


Patient was discharged to AMA


Discharge Diagnoses:  











Veronica Cheung NP Jul 6, 2017 15:32

## 2018-02-07 ENCOUNTER — HOSPITAL ENCOUNTER (EMERGENCY)
Dept: HOSPITAL 72 - EMR | Age: 38
Discharge: HOME | End: 2018-02-07
Payer: MEDICARE

## 2018-02-07 VITALS — SYSTOLIC BLOOD PRESSURE: 127 MMHG | DIASTOLIC BLOOD PRESSURE: 77 MMHG

## 2018-02-07 VITALS — BODY MASS INDEX: 35.55 KG/M2 | WEIGHT: 240 LBS | HEIGHT: 69 IN

## 2018-02-07 VITALS — DIASTOLIC BLOOD PRESSURE: 77 MMHG | SYSTOLIC BLOOD PRESSURE: 127 MMHG

## 2018-02-07 DIAGNOSIS — J45.909: ICD-10-CM

## 2018-02-07 DIAGNOSIS — M54.6: Primary | ICD-10-CM

## 2018-02-07 DIAGNOSIS — Z88.0: ICD-10-CM

## 2018-02-07 LAB
ADD MANUAL DIFF: NO
ANION GAP SERPL CALC-SCNC: 8 MMOL/L (ref 5–15)
APPEARANCE UR: CLEAR
APTT PPP: YELLOW S
BASOPHILS NFR BLD AUTO: 1.2 % (ref 0–2)
BUN SERPL-MCNC: 19 MG/DL (ref 7–18)
CALCIUM SERPL-MCNC: 9.4 MG/DL (ref 8.5–10.1)
CHLORIDE SERPL-SCNC: 107 MMOL/L (ref 98–107)
CO2 SERPL-SCNC: 26 MMOL/L (ref 21–32)
CREAT SERPL-MCNC: 1.4 MG/DL (ref 0.55–1.3)
EOSINOPHIL NFR BLD AUTO: 4.7 % (ref 0–3)
ERYTHROCYTE [DISTWIDTH] IN BLOOD BY AUTOMATED COUNT: 14 % (ref 11.6–14.8)
GLUCOSE UR STRIP-MCNC: NEGATIVE MG/DL
HCT VFR BLD CALC: 42.3 % (ref 42–52)
HGB BLD-MCNC: 13.9 G/DL (ref 14.2–18)
KETONES UR QL STRIP: NEGATIVE
LEUKOCYTE ESTERASE UR QL STRIP: NEGATIVE
LYMPHOCYTES NFR BLD AUTO: 23.3 % (ref 20–45)
MCV RBC AUTO: 80 FL (ref 80–99)
MONOCYTES NFR BLD AUTO: 8.8 % (ref 1–10)
NEUTROPHILS NFR BLD AUTO: 62 % (ref 45–75)
NITRITE UR QL STRIP: NEGATIVE
PH UR STRIP: 6.5 [PH] (ref 4.5–8)
PLATELET # BLD: 217 K/UL (ref 150–450)
POTASSIUM SERPL-SCNC: 4.6 MMOL/L (ref 3.5–5.1)
PROT UR QL STRIP: NEGATIVE
RBC # BLD AUTO: 5.25 M/UL (ref 4.7–6.1)
SODIUM SERPL-SCNC: 141 MMOL/L (ref 136–145)
SP GR UR STRIP: 1.01 (ref 1–1.03)
UROBILINOGEN UR-MCNC: NORMAL MG/DL (ref 0–1)
WBC # BLD AUTO: 8.2 K/UL (ref 4.8–10.8)

## 2018-02-07 PROCEDURE — 74176 CT ABD & PELVIS W/O CONTRAST: CPT

## 2018-02-07 PROCEDURE — 96375 TX/PRO/DX INJ NEW DRUG ADDON: CPT

## 2018-02-07 PROCEDURE — 80048 BASIC METABOLIC PNL TOTAL CA: CPT

## 2018-02-07 PROCEDURE — 85025 COMPLETE CBC W/AUTO DIFF WBC: CPT

## 2018-02-07 PROCEDURE — 36415 COLL VENOUS BLD VENIPUNCTURE: CPT

## 2018-02-07 PROCEDURE — 81003 URINALYSIS AUTO W/O SCOPE: CPT

## 2018-02-07 PROCEDURE — 99284 EMERGENCY DEPT VISIT MOD MDM: CPT

## 2018-02-07 PROCEDURE — 96374 THER/PROPH/DIAG INJ IV PUSH: CPT

## 2018-02-07 NOTE — EMERGENCY ROOM REPORT
History of Present Illness


General


Chief Complaint:  Back Pain-No Injury


Source:  Patient





Present Illness


HPI


Is a 37-year-old male with a history of sleep apnea.  He presents to complaint 

of mid back pain.  Onset last night.  Increasing pain.  Ultram not helping.  

Pain is mid right upper back.  10 out of 10.  No radiation.  Worse with 

movement.  No nausea no vomiting.  No dysuria frequency.


Allergies:  


Coded Allergies:  


     PENICILLINS (Unverified  Allergy, Unknown, 12/9/14)





Patient History


Past Medical History:  see triage record, old chart reviewed


Past Surgical History:  none


Pertinent Family History:  none


Social History:  Denies: smoking


Immunizations:  other


Reviewed Nursing Documentation:  PMH: Agreed, PSxH: Agreed





Nursing Documentation-PMH


Hx Asthma:  Yes





Review of Systems


Eye:  Denies: eye pain, blurred vision


ENT:  Denies: ear pain, nose congestion, throat swelling


Respiratory:  Denies: cough, shortness of breath


Cardiovascular:  Denies: chest pain, palpitations


Gastrointestinal:  Denies: abdominal pain, diarrhea, nausea, vomiting


Musculoskeletal:  Reports: back pain, Denies: joint pain


Skin:  Denies: rash


Neurological:  Denies: headache, numbness


Endocrine:  Denies: increased thirst, increased urine


Hematologic/Lymphatic:  Denies: easy bruising


All Other Systems:  negative except mentioned in HPI





Physical Exam





Vital Signs








  Date Time  Temp Pulse Resp B/P (MAP) Pulse Ox O2 Delivery O2 Flow Rate FiO2


 


2/7/18 20:11 98.4 112 18 127/77 98 Room Air  





vitals with tachycardia


Sp02 EP Interpretation:  reviewed, normal


General Appearance:  well appearing, no apparent distress, alert


Head:  normocephalic, atraumatic


Eyes:  bilateral eye PERRL, bilateral eye EOMI


ENT:  hearing grossly normal, normal pharynx


Neck:  full range of motion, supple, no meningismus


Respiratory:  chest non-tender, lungs clear, normal breath sounds


Cardiovascular #1:  regular rate, rhythm, no murmur


Gastrointestinal:  normal bowel sounds, non tender, no mass, no organomegaly, 

no bruit, non-distended


Musculoskeletal:  back normal - Right upper flank pain, gait/station normal, 

normal range of motion


Psychiatric:  mood/affect normal


Skin:  warm/dry





Medical Decision Making


Diagnostic Impression:  


 Primary Impression:  


 Acute thoracic back pain


 Qualified Codes:  M54.6 - Pain in thoracic spine


ER Course


Patient with upper back pain.  No evidence of dissection.  No evidence of 

ureteral stone.  Pain is better now.  He said that he is no longer using 

amphetamine.  We'll discharge home.





Last Vital Signs








  Date Time  Temp Pulse Resp B/P (MAP) Pulse Ox O2 Delivery O2 Flow Rate FiO2


 


2/7/18 20:32 98.4  18 127/77 98 Room Air  


 


2/7/18 20:11  112      








Status:  improved


Disposition:  HOME, SELF-CARE


Condition:  Stable


Scripts


Ibuprofen* (MOTRIN*) 600 Mg Tablet


600 MG ORAL THREE TIMES A DAY, #30 TAB 0 Refills


   Prov: CHRISTIANE LIVE M.D.         2/7/18 


Hydrocodone/Acetaminophen 5-325* (HYDROCODONE/ACETAMINOPHEN 5-325*) 1 Each 

Tablet


1 TAB ORAL Q6H Y for For Pain, #15 TAB 0 Refills


   Prov: CHRISTIANE LIVE M.D.         2/7/18


Referrals:  


NOT CHOSEN IPA/MD,REFERRING (PCP)


Patient Instructions:  Back Pain, Adult





Additional Instructions:  


Followup with your Dr. in 7 days.  Return if worse.











CHRISTIANE LIVE M.D. Feb 7, 2018 22:40

## 2018-02-08 NOTE — DIAGNOSTIC IMAGING REPORT
Indication: Abdominal pain, right flank pain.

 

Technique: CT of the abdomen and pelvis utilizing automated exposure control without

intravenous or oral contrast. 

 

CT dose: Total .08 mGycm; CTDI vol 17.49 mGy

 

Comparison: 9/10/2015

 

Findings: 

Please note that evaluation of the abdominal and pelvic viscera is limited without

the use of intravenous and oral contrast. Within these limitations, the following

observations are made:

 

There is a 4 mm density along the major fissure on the right (series 5 image #8)

which may represent a intrapulmonary lymph node. This was seen on the prior exam.

There is dependent bibasilar atelectasis. Heart size within normal limits. No

pericardial effusion.

 

Gallbladder is contracted, limiting its evaluation. Noncontrast evaluation of the

liver, spleen, adrenal glands and pancreas is grossly unremarkable.

 

Kidneys are symmetric in size. There is no urinary tract stone or hydronephrosis

bilaterally. Bladder is decompressed, limiting its evaluation. Prostate is not

enlarged. Previously described rounded structure in the left inguinal canal not seen

on this exam, likely outside the field of view.

 

There is no bowel obstruction. No free intraperitoneal fluid or air is seen. The

appendix is normal.

 

Abdominal aorta is normal in caliber. No bulky lymphadenopathy is identified. No

acute osseous abnormality seen.

 

IMPRESSION: 

Limited exam without intravenous and oral contrast. Within these limitations:

 

No evidence of acute intra-abdominal pathology. Specifically, no evidence of

hydronephrosis or urinary tract stone as questioned clinically.

 

4 mm nodule along the fissure in the right lower lobe, possibly intrapulmonary lymph

node. Appearance is stable compared to the prior exam.

 

Per 2017 Fleischner Society criteria, no routine imaging follow-up recommended.

 

This corresponds with the statrad preliminary report.

 

The CT scanner at [facility] is accredited by the American College of

Radiology and the scans are performed using protocols designed to limit radiation

exposure to as low as reasonably achievable to attain images of sufficient resolution

adequate for diagnostic evaluation.